# Patient Record
Sex: FEMALE | Race: BLACK OR AFRICAN AMERICAN | Employment: FULL TIME | URBAN - METROPOLITAN AREA
[De-identification: names, ages, dates, MRNs, and addresses within clinical notes are randomized per-mention and may not be internally consistent; named-entity substitution may affect disease eponyms.]

---

## 2021-12-27 ENCOUNTER — HOSPITAL ENCOUNTER (EMERGENCY)
Age: 41
Discharge: HOME | End: 2021-12-27
Attending: EMERGENCY MEDICINE

## 2021-12-27 ENCOUNTER — APPOINTMENT (OUTPATIENT)
Dept: GENERAL RADIOLOGY | Age: 41
End: 2021-12-27
Attending: EMERGENCY MEDICINE

## 2021-12-27 VITALS
SYSTOLIC BLOOD PRESSURE: 141 MMHG | HEART RATE: 93 BPM | BODY MASS INDEX: 38.92 KG/M2 | HEIGHT: 61 IN | TEMPERATURE: 98 F | WEIGHT: 206.13 LBS | RESPIRATION RATE: 18 BRPM | OXYGEN SATURATION: 100 % | DIASTOLIC BLOOD PRESSURE: 89 MMHG

## 2021-12-27 DIAGNOSIS — R07.89 CHEST TIGHTNESS: ICD-10-CM

## 2021-12-27 DIAGNOSIS — R20.2 PARESTHESIA OF LEFT UPPER EXTREMITY: Primary | ICD-10-CM

## 2021-12-27 LAB
ALBUMIN SERPL-MCNC: 3.8 G/DL (ref 3.5–5)
ALBUMIN/GLOB SERPL: 0.8 {RATIO} (ref 1.1–2.2)
ALP SERPL-CCNC: 82 U/L (ref 45–117)
ALT SERPL-CCNC: 18 U/L (ref 12–78)
ANION GAP SERPL CALC-SCNC: 5 MMOL/L (ref 5–15)
AST SERPL-CCNC: 22 U/L (ref 15–37)
ATRIAL RATE: 91 BPM
BASOPHILS # BLD: 0 K/UL (ref 0–0.1)
BASOPHILS NFR BLD: 1 % (ref 0–1)
BILIRUB SERPL-MCNC: 0.7 MG/DL (ref 0.2–1)
BNP SERPL-MCNC: 24 PG/ML
BUN SERPL-MCNC: 9 MG/DL (ref 6–20)
BUN/CREAT SERPL: 9 (ref 12–20)
CALCIUM SERPL-MCNC: 8.9 MG/DL (ref 8.5–10.1)
CALCULATED P AXIS, ECG09: -29 DEGREES
CALCULATED R AXIS, ECG10: 32 DEGREES
CALCULATED T AXIS, ECG11: 19 DEGREES
CHLORIDE SERPL-SCNC: 104 MMOL/L (ref 97–108)
CO2 SERPL-SCNC: 27 MMOL/L (ref 21–32)
CREAT SERPL-MCNC: 1.04 MG/DL (ref 0.55–1.02)
DIAGNOSIS, 93000: NORMAL
DIFFERENTIAL METHOD BLD: ABNORMAL
EOSINOPHIL # BLD: 0.2 K/UL (ref 0–0.4)
EOSINOPHIL NFR BLD: 2 % (ref 0–7)
ERYTHROCYTE [DISTWIDTH] IN BLOOD BY AUTOMATED COUNT: 13 % (ref 11.5–14.5)
GLOBULIN SER CALC-MCNC: 4.7 G/DL (ref 2–4)
GLUCOSE SERPL-MCNC: 113 MG/DL (ref 65–100)
HCT VFR BLD AUTO: 43.1 % (ref 35–47)
HGB BLD-MCNC: 14.4 G/DL (ref 11.5–16)
IMM GRANULOCYTES # BLD AUTO: 0 K/UL (ref 0–0.04)
IMM GRANULOCYTES NFR BLD AUTO: 0 % (ref 0–0.5)
LYMPHOCYTES # BLD: 3.6 K/UL (ref 0.8–3.5)
LYMPHOCYTES NFR BLD: 48 % (ref 12–49)
MCH RBC QN AUTO: 31.4 PG (ref 26–34)
MCHC RBC AUTO-ENTMCNC: 33.4 G/DL (ref 30–36.5)
MCV RBC AUTO: 93.9 FL (ref 80–99)
MONOCYTES # BLD: 0.7 K/UL (ref 0–1)
MONOCYTES NFR BLD: 9 % (ref 5–13)
NEUTS SEG # BLD: 3 K/UL (ref 1.8–8)
NEUTS SEG NFR BLD: 40 % (ref 32–75)
NRBC # BLD: 0 K/UL (ref 0–0.01)
NRBC BLD-RTO: 0 PER 100 WBC
P-R INTERVAL, ECG05: 152 MS
PLATELET # BLD AUTO: 173 K/UL (ref 150–400)
PMV BLD AUTO: 9.6 FL (ref 8.9–12.9)
POTASSIUM SERPL-SCNC: 4 MMOL/L (ref 3.5–5.1)
PROT SERPL-MCNC: 8.5 G/DL (ref 6.4–8.2)
Q-T INTERVAL, ECG07: 348 MS
QRS DURATION, ECG06: 78 MS
QTC CALCULATION (BEZET), ECG08: 428 MS
RBC # BLD AUTO: 4.59 M/UL (ref 3.8–5.2)
SODIUM SERPL-SCNC: 136 MMOL/L (ref 136–145)
TROPONIN-HIGH SENSITIVITY: 5 NG/L (ref 0–51)
TROPONIN-HIGH SENSITIVITY: 6 NG/L (ref 0–51)
TSH SERPL DL<=0.05 MIU/L-ACNC: 0.7 UIU/ML (ref 0.36–3.74)
VENTRICULAR RATE, ECG03: 91 BPM
WBC # BLD AUTO: 7.4 K/UL (ref 3.6–11)

## 2021-12-27 PROCEDURE — 84484 ASSAY OF TROPONIN QUANT: CPT

## 2021-12-27 PROCEDURE — 93005 ELECTROCARDIOGRAM TRACING: CPT

## 2021-12-27 PROCEDURE — 80053 COMPREHEN METABOLIC PANEL: CPT

## 2021-12-27 PROCEDURE — 71046 X-RAY EXAM CHEST 2 VIEWS: CPT

## 2021-12-27 PROCEDURE — 84443 ASSAY THYROID STIM HORMONE: CPT

## 2021-12-27 PROCEDURE — 85025 COMPLETE CBC W/AUTO DIFF WBC: CPT

## 2021-12-27 PROCEDURE — 99282 EMERGENCY DEPT VISIT SF MDM: CPT

## 2021-12-27 PROCEDURE — 83880 ASSAY OF NATRIURETIC PEPTIDE: CPT

## 2021-12-27 PROCEDURE — 36415 COLL VENOUS BLD VENIPUNCTURE: CPT

## 2021-12-27 RX ORDER — LORAZEPAM 0.5 MG/1
0.5 TABLET ORAL
Qty: 12 TABLET | Refills: 0 | Status: SHIPPED | OUTPATIENT
Start: 2021-12-27

## 2021-12-27 NOTE — ED PROVIDER NOTES
EMERGENCY DEPARTMENT HISTORY AND PHYSICAL EXAM      Date: 12/27/2021  Patient Name: Lance Daley    History of Presenting Illness     Chief Complaint   Patient presents with    Chest Pain     onset wth pain to the chest with tighness, some numbness to the left arm       History Provided By: Patient    HPI: Lance Daley, 37 y.o. female without significant medical history presents to the ED with cc of left arm paresthesias x2 to 3 weeks. She has sensation of chest tightness on the left side of her chest over the past 2 days as well as occasional palpitations over the past few weeks. Symptoms are intermittent and nonexertional.  She does endorse lightheadedness and dizziness upon standing. Denies associated shortness of breath or diaphoresis. She has had some recent stressors with her mother passing away a few months ago and believes that she may be having some anxiety. Denies neck pain or arm weakness. Denies back pain, shortness of breath, or pleuritic pain. There are no other complaints, changes, or physical findings at this time. PCP: None    No current facility-administered medications on file prior to encounter. No current outpatient medications on file prior to encounter. Past History     Past Medical History:  No past medical history on file. Past Surgical History:  No past surgical history on file. Family History:  No family history on file. Social History:  Social History     Tobacco Use    Smoking status: Not on file    Smokeless tobacco: Not on file   Substance Use Topics    Alcohol use: Not on file    Drug use: Not on file       Allergies:  Not on File      Review of Systems   Review of Systems   Constitutional: Negative for chills and fever. Respiratory: Positive for chest tightness. Negative for cough and shortness of breath. Cardiovascular: Positive for palpitations. Negative for chest pain and leg swelling.    Gastrointestinal: Negative for abdominal pain, nausea and vomiting. Genitourinary: Negative. Musculoskeletal: Negative for back pain and gait problem. Skin: Negative for color change and rash. Neurological: Negative for dizziness, weakness, light-headedness, numbness and headaches. All other systems reviewed and are negative. Physical Exam   Physical Exam  Vitals and nursing note reviewed. Constitutional:       General: She is not in acute distress. Appearance: Normal appearance. She is not ill-appearing or toxic-appearing. HENT:      Head: Normocephalic and atraumatic. Nose: Nose normal.      Mouth/Throat:      Mouth: Mucous membranes are moist.   Eyes:      Extraocular Movements: Extraocular movements intact. Pupils: Pupils are equal, round, and reactive to light. Cardiovascular:      Rate and Rhythm: Normal rate and regular rhythm. Heart sounds: No murmur heard. Pulmonary:      Effort: Pulmonary effort is normal. No respiratory distress. Breath sounds: Normal breath sounds. No wheezing. Abdominal:      General: There is no distension. Palpations: Abdomen is soft. Tenderness: There is no abdominal tenderness. There is no guarding or rebound. Musculoskeletal:         General: No swelling or tenderness. Normal range of motion. Cervical back: Normal range of motion and neck supple. Right lower leg: No edema. Left lower leg: No edema. Skin:     General: Skin is warm and dry. Coloration: Skin is not pale. Findings: No erythema. Neurological:      General: No focal deficit present. Mental Status: She is alert and oriented to person, place, and time. Comments: Motor 5/5 throughout bilateral upper extremities, sensation intact. Diagnostic Study Results     Labs -     Labs Reviewed   CBC WITH AUTOMATED DIFF - Abnormal; Notable for the following components:       Result Value    ABS.  LYMPHOCYTES 3.6 (*)     All other components within normal limits   METABOLIC PANEL, COMPREHENSIVE - Abnormal; Notable for the following components:    Glucose 113 (*)     Creatinine 1.04 (*)     BUN/Creatinine ratio 9 (*)     GFR est non-AA 59 (*)     Protein, total 8.5 (*)     Globulin 4.7 (*)     A-G Ratio 0.8 (*)     All other components within normal limits   NT-PRO BNP   TROPONIN-HIGH SENSITIVITY   TSH 3RD GENERATION   TROPONIN-HIGH SENSITIVITY       Radiologic Studies -   XR CHEST PA LAT   Final Result   Clear lungs. CT Results  (Last 48 hours)    None        CXR Results  (Last 48 hours)    None          Medical Decision Making   I am the first provider for this patient. I reviewed the vital signs, available nursing notes, past medical history, past surgical history, family history and social history. Vital Signs-Reviewed the patient's vital signs. Visit Vitals  BP (!) 141/89 (BP 1 Location: Left upper arm, BP Patient Position: At rest)   Pulse 93   Temp 98 °F (36.7 °C)   Resp 18   Ht 5' 1\" (1.549 m)   Wt 93.5 kg (206 lb 2.1 oz)   SpO2 100%   BMI 38.95 kg/m²       EKG interpretation:   EKG per my interpretation Normal sinus rhythm, Rate 91bpm, normal axis, Normal intervals without evidence of AV conduction block or QTc prolongation, No acute ischemic changes. Records Reviewed: Nursing Notes    Provider Notes (Medical Decision Making):   60-year-old female presenting with left arm paresthesias and chest tightness with occasional palpitations. She is afebrile and vital signs are stable. Symptoms have been present with the past 2 to 3 weeks. Symptoms do not appear typical for ACS and she is low risk by heart score. Symptoms do not appear consistent with PE and she is PERC negative. Troponin extremely low at 6, repeated it is flat at 5. TSH within normal limits. Electrolytes within normal limits. Consider anxiety given recent stressors with family member death. Encourage close follow-up with PCP and given strict return precautions.   All questions answered and she agrees with plan as above. ED Course:   Initial assessment performed. The patients presenting problems have been discussed, and they are in agreement with the care plan formulated and outlined with them. I have encouraged them to ask questions as they arise throughout their visit. ED Course as of 12/30/21 1204   Mon Dec 27, 2021   1135 EKG per my interpretation normal sinus rhythm, rate 91 bpm, normal axis, no acute ischemic changes, no interval changes. [AK]      ED Course User Index  [AK] Harshal Peng MD     Discharge Note:  The patient has been re-evaluated and is ready for discharge. Reviewed available results with patient. Counseled patient on diagnosis and care plan. Patient has expressed understanding, and all questions have been answered. Patient agrees with plan and agrees to follow up as recommended, or to return to the ED if their symptoms worsen. Discharge instructions have been provided and explained to the patient, along with reasons to return to the ED. Disposition:  Discharge      DISCHARGE PLAN:  1. Discharge Medication List as of 12/27/2021  2:06 PM        2. Follow-up Information     Follow up With Specialties Details Why [address] Internal Medicine Schedule an appointment as soon as possible for a visit  to establish with a PCP [place] [phone]    [facility] EMERGENCY DEPT Emergency Medicine Go to  As needed, If symptoms worsen [address]  [address] [facility]  [phone]        3. Return to ED if worse     Diagnosis     Clinical Impression:   1. Paresthesia of left upper extremity    2. Chest tightness        Attestations:  I am the first and primary provider of record for this patient's ED encounter. I personally performed the services described above in this documentation.   Gunner Henderson MD    Please note that this dictation was completed with Go[employer], the computer voice recognition software. Quite often unanticipated grammatical, syntax, homophones, and other interpretive errors are inadvertently transcribed by the computer software. Please disregard these errors. Please excuse any errors that have escaped final proofreading. Thank you.

## 2021-12-27 NOTE — DISCHARGE INSTRUCTIONS
You were evaluated in the emergency department for arm pins and needles and chest tightness. Your examination was reassuring as was your work-up including EKG, chest xray, and blood work. It will be important for you to follow-up with your primary care physician in 2-3 days. If you develop worsening symptoms such as chest pain or shortness of breath, please return to the emergency department immediately. It was a pleasure taking care of you at [facility] Emergency Department today. We know that when you come to [facility], you are entrusting us with your health, comfort, and safety. Our physicians and nurses honor that trust, and we truly appreciate the opportunity to care for you and your loved ones. We also value your feedback. If you receive a survey about your Emergency Department experience today, please fill it out. We care about our patients' feedback, and we listen to what you have to say. Thank you!

## 2022-04-21 ENCOUNTER — TRANSCRIBE ORDER (OUTPATIENT)
Dept: SCHEDULING | Age: 42
End: 2022-04-21

## 2022-04-21 DIAGNOSIS — Z12.31 ENCOUNTER FOR SCREENING MAMMOGRAM FOR MALIGNANT NEOPLASM OF BREAST: Primary | ICD-10-CM
